# Patient Record
Sex: MALE | Race: WHITE | Employment: FULL TIME | URBAN - METROPOLITAN AREA
[De-identification: names, ages, dates, MRNs, and addresses within clinical notes are randomized per-mention and may not be internally consistent; named-entity substitution may affect disease eponyms.]

---

## 2020-09-22 ENCOUNTER — HOSPITAL ENCOUNTER (OUTPATIENT)
Dept: ULTRASOUND IMAGING | Age: 30
Discharge: HOME | End: 2020-09-22
Payer: MEDICAID

## 2020-09-22 PROCEDURE — 76700 US EXAM ABDOM COMPLETE: CPT

## 2021-07-20 ENCOUNTER — TELEPHONE (OUTPATIENT)
Dept: BARIATRICS/WEIGHT MGMT | Age: 31
End: 2021-07-20

## 2021-07-20 NOTE — TELEPHONE ENCOUNTER
Patient was sent Dr. Rashid Barrera digital bariatric seminar. Patient is financially covered by [facility] (0 diet) THIS IS NOT AN INSURANCE POLICY. All pre-surgical appointments/labs/testing & psych eval covered. Must notify [facility] of all visits once scheduled. Auth# [id number]    *Spoke with pt, info given. Pt verbalized understanding. Appt sched. NP pk mailed.

## 2021-08-10 ENCOUNTER — TELEPHONE (OUTPATIENT)
Dept: BARIATRICS/WEIGHT MGMT | Age: 31
End: 2021-08-10

## 2021-08-11 ENCOUNTER — OFFICE VISIT (OUTPATIENT)
Dept: BARIATRICS/WEIGHT MGMT | Age: 31
End: 2021-08-11
Payer: MEDICAID

## 2021-08-11 VITALS
SYSTOLIC BLOOD PRESSURE: 133 MMHG | WEIGHT: 259 LBS | HEIGHT: 73 IN | BODY MASS INDEX: 34.33 KG/M2 | DIASTOLIC BLOOD PRESSURE: 84 MMHG | HEART RATE: 88 BPM

## 2021-08-11 DIAGNOSIS — G47.30 SLEEP APNEA, UNSPECIFIED TYPE: ICD-10-CM

## 2021-08-11 DIAGNOSIS — K21.9 CHRONIC GERD: Primary | ICD-10-CM

## 2021-08-11 DIAGNOSIS — E66.9 OBESITY (BMI 30-39.9): ICD-10-CM

## 2021-08-11 PROCEDURE — 4004F PT TOBACCO SCREEN RCVD TLK: CPT | Performed by: SURGERY

## 2021-08-11 PROCEDURE — G8417 CALC BMI ABV UP PARAM F/U: HCPCS | Performed by: SURGERY

## 2021-08-11 PROCEDURE — G8427 DOCREV CUR MEDS BY ELIG CLIN: HCPCS | Performed by: SURGERY

## 2021-08-11 PROCEDURE — 99204 OFFICE O/P NEW MOD 45 MIN: CPT | Performed by: SURGERY

## 2021-08-11 RX ORDER — NAPROXEN 500 MG/1
500 TABLET ORAL 2 TIMES DAILY WITH MEALS
COMMUNITY
Start: 2021-01-20

## 2021-08-11 RX ORDER — OMEPRAZOLE 20 MG/1
40 CAPSULE, DELAYED RELEASE ORAL
COMMUNITY
Start: 2020-12-10

## 2021-08-11 NOTE — PROGRESS NOTES
Manohar Jaimes is a 36 y.o. male with a date of birth of 1990. Vitals:    08/11/21 0903   BP: 133/84   Pulse: 88    BMI: Body mass index is 34.64 kg/m². Obesity Classification: Class I    Weight History: Wt Readings from Last 3 Encounters:   08/11/21 259 lb (117.5 kg)     Pt declined 1800kcal LCMP and budget friendly handouts. Pt reports has tried to lose wt but finds it difficult to follow meal plans d/t financial concerns. Pt has reached out to food [employer] / [employer] but per pt not option. Assessment  Nutritional Needs: RMR=(9.99 x 117.5) + (6.25 x 184.2) - (4.92 x 31 y.o.) + 5  = 2186 kcal x 1.4 (light / sedentary activity factor)= 3060 kcal - 1000 (for 2 lb weight loss/week)= 2060 kcal.    PES Statement:  Overweight/Obesity related to lack of exercise, sedentary lifestyle, unhealthy eating habits, and unsuccessful diet attempts as evidenced by BMI. Body mass index is 34.64 kg/m². Will follow up as necessary.     Sally Spears, ROSALIA, LD

## 2021-09-06 NOTE — PROGRESS NOTES
[facility]) Physicians   General & Laparoscopic Surgery  [employer]      HPI:    Mattie Rios is a very pleasant 36 y.o. obese male ,   Body mass index is 34.64 kg/m². And multiple medical problems who is presenting for GERD surgery evaluation and consultation    Patient was told a RYGB would be the best option to treat his GERD, as he is obese    He has not tried any organized diet or exercise program, or significant dietary modifications for his GERD. Past Medical History:   Diagnosis Date    Abdominal hernia     hiatal    Anxiety     Back pain     Chronic GERD     Depression     Joint pain     Obesity (BMI 30-39. 9)     Sleep apnea     Urinary incontinence      Past Surgical History:   Procedure Laterality Date    CHOLECYSTECTOMY  2018    HIP SURGERY Right     SHOULDER SURGERY Right      No family history on file. Social History     Tobacco Use    Smoking status: Current Some Day Smoker     Types: Cigarettes     Start date: 7/26/2010    Smokeless tobacco: Current User     Types: Chew   Substance Use Topics    Alcohol use: Not Currently     I counseled the patient on the importance of not smoking and risks of ETOH. Allergies   Allergen Reactions    Gabapentin Hives    Venlafaxine Hives     Other reaction(s): Fever     Vitals:    08/11/21 0903   BP: 133/84   Pulse: 88   Weight: 259 lb (117.5 kg)   Height: 6' 0.5\" (1.842 m)       Body mass index is 34.64 kg/m².       Current Outpatient Medications:     omeprazole (PRILOSEC) 20 MG delayed release capsule, Take 40 mg by mouth, Disp: , Rfl:     naproxen (NAPROSYN) 500 MG tablet, Take 500 mg by mouth 2 times daily (with meals), Disp: , Rfl:       Review of Systems - History obtained from the patient  General ROS: negative  Psychological ROS: negative  Ophthalmic ROS: negative  Neurological ROS: negative  ENT ROS: negative  Allergy and Immunology ROS: negative  Hematological and Lymphatic ROS: negative  Endocrine ROS: negative  Respiratory ROS: negative  Cardiovascular ROS: negative  Gastrointestinal ROS:GERD  Genito-Urinary ROS: negative  Musculoskeletal ROS: negative   Skin ROS: negative    Physical Exam   Constitutional: Patient is oriented to person, place, and time. Vital signs are normal. Patient  appears well-developed and well-nourished. Patient  is active and cooperative. Non-toxic appearance. No distress. HENT:   Head: Normocephalic and atraumatic. Head is without laceration. Right Ear: External ear normal. No lacerations. No drainage, swelling or tenderness. Left Ear: External ear normal. No lacerations. No drainage, swelling or tenderness. Nose: Nose normal. No nose lacerations or nasal deformity. Mouth/Throat: Uvula is midline, oropharynx is clear and moist and mucous membranes are normal. No oropharyngeal exudate. Eyes: Conjunctivae, EOM and lids are normal. Pupils are equal, round, and reactive to light. Right eye exhibits no discharge. No foreign body present in the right eye. Left eye exhibits no discharge. No foreign body present in the left eye. No scleral icterus. Neck: Trachea normal and normal range of motion. Neck supple. No JVD present. No tracheal tenderness present. Carotid bruit is not present. No rigidity. No tracheal deviation and no edema present. No thyromegaly present. Cardiovascular: Normal rate, regular rhythm, normal heart sounds, intact distal pulses and normal pulses. Pulmonary/Chest: Effort normal and breath sounds normal. No stridor. No respiratory distress. Patient  has no wheezes. Patient has no rales. Patient exhibits no tenderness and no crepitus. Abdominal: Soft. Normal appearance and bowel sounds are normal. Patient exhibits no distension, no abdominal bruit, no ascites and no mass. There is no hepatosplenomegaly. There is no tenderness. There is no rigidity, no rebound, no guarding and no CVA tenderness. No hernia. Hernia confirmed negative in the ventral area. Musculoskeletal: Normal range of motion. Patient exhibits no edema or tenderness. Lymphadenopathy:        Head (right side): No submental, no submandibular, no preauricular, no posterior auricular and no occipital adenopathy present. Head (left side): No submental, no submandibular, no preauricular, no posterior auricular and no occipital adenopathy present. Patient  has no cervical adenopathy. Right: No supraclavicular adenopathy present. Left: No supraclavicular adenopathy present. Neurological: Patient is alert and oriented to person, place, and time. Patient has normal strength. Coordination and gait normal. GCS eye subscore is 4. GCS verbal subscore is 5. GCS motor subscore is 6. Skin: Skin is warm and dry. No abrasion and no rash noted. Patient  is not diaphoretic. No cyanosis or erythema. Psychiatric: Patient has a normal mood and affect. speech is normal and behavior is normal. Cognition and memory are normal.             A/P  Gaile Face is a very pleasant 36 y.o. male with GERD & Obesity,  Body mass index is 34.64 kg/m². and multiple obesity related co-morbidities. We discussed how his weight affects his overall health including:  Bert Valencia was seen today for bariatric, initial visit. Diagnoses and all orders for this visit:    Chronic GERD    Obesity (BMI 30-39. 9)    Sleep apnea, unspecified type      The patient underwent 30 minutes of dietary counseling. I have reviewed, discussed and agree with the dietary plan. At this point I believe the best course of action is to help Mr. Robb Wallace with non-surgical weight loss to get from BMI 34.64 down to BMI 30, after which I would recommend a Nissen fundoplication for his GERD. I believe going straight to a kerwin en Y for Andre Hsu without any efforts to lose weight would be an aggressive first line surgical treatment for GERD, particularly as his BMI is less than 35.     Patient does not seem interested in this evan.     Happy to work with him if he changes his mind    Varun Marroquin